# Patient Record
Sex: FEMALE | Race: WHITE | NOT HISPANIC OR LATINO | Employment: FULL TIME | URBAN - NONMETROPOLITAN AREA
[De-identification: names, ages, dates, MRNs, and addresses within clinical notes are randomized per-mention and may not be internally consistent; named-entity substitution may affect disease eponyms.]

---

## 2022-10-11 ENCOUNTER — OFFICE VISIT (OUTPATIENT)
Dept: ENDOCRINOLOGY | Facility: CLINIC | Age: 77
End: 2022-10-11

## 2022-10-11 VITALS
HEART RATE: 79 BPM | SYSTOLIC BLOOD PRESSURE: 132 MMHG | HEIGHT: 68 IN | BODY MASS INDEX: 35.31 KG/M2 | WEIGHT: 233 LBS | DIASTOLIC BLOOD PRESSURE: 60 MMHG | OXYGEN SATURATION: 99 %

## 2022-10-11 DIAGNOSIS — E06.3 HYPOTHYROIDISM DUE TO HASHIMOTO'S THYROIDITIS: ICD-10-CM

## 2022-10-11 DIAGNOSIS — E03.8 HYPOTHYROIDISM DUE TO HASHIMOTO'S THYROIDITIS: ICD-10-CM

## 2022-10-11 DIAGNOSIS — E10.21 DIABETIC NEPHROPATHY ASSOCIATED WITH TYPE 1 DIABETES MELLITUS: ICD-10-CM

## 2022-10-11 DIAGNOSIS — I10 PRIMARY HYPERTENSION: ICD-10-CM

## 2022-10-11 DIAGNOSIS — E10.649 TYPE 1 DIABETES MELLITUS WITH HYPOGLYCEMIA AND WITHOUT COMA: Primary | ICD-10-CM

## 2022-10-11 DIAGNOSIS — K90.41 GLUTEN INTOLERANCE: ICD-10-CM

## 2022-10-11 DIAGNOSIS — E78.2 MIXED HYPERLIPIDEMIA: ICD-10-CM

## 2022-10-11 PROBLEM — E78.5 HYPERLIPIDEMIA: Status: ACTIVE | Noted: 2022-10-11

## 2022-10-11 LAB — HBA1C MFR BLD: 6.7 %

## 2022-10-11 PROCEDURE — 99204 OFFICE O/P NEW MOD 45 MIN: CPT | Performed by: INTERNAL MEDICINE

## 2022-10-11 RX ORDER — ISOPROPYL ALCOHOL 0.7 ML/1
SWAB TOPICAL
Qty: 120 EACH | Refills: 11 | Status: SHIPPED | OUTPATIENT
Start: 2022-10-11

## 2022-10-11 RX ORDER — MULTIPLE VITAMINS W/ MINERALS TAB 9MG-400MCG
1 TAB ORAL 2 TIMES DAILY
COMMUNITY

## 2022-10-11 RX ORDER — GLUCAGON 3 MG/1
1 POWDER NASAL AS NEEDED
Qty: 2 EACH | Refills: 11 | Status: SHIPPED | OUTPATIENT
Start: 2022-10-11

## 2022-10-11 RX ORDER — GLUCOSAMINE HCL/CHONDROITIN SU 500-400 MG
CAPSULE ORAL
Qty: 150 EACH | Refills: 11 | Status: SHIPPED | OUTPATIENT
Start: 2022-10-11

## 2022-10-11 RX ORDER — INSULIN LISPRO 100 [IU]/ML
INJECTION, SOLUTION INTRAVENOUS; SUBCUTANEOUS
Qty: 30 ML | Refills: 11 | Status: SHIPPED | OUTPATIENT
Start: 2022-10-11

## 2022-10-11 RX ORDER — BLOOD-GLUCOSE METER
KIT MISCELLANEOUS
Qty: 120 EACH | Refills: 11 | Status: SHIPPED | OUTPATIENT
Start: 2022-10-11

## 2022-10-11 RX ORDER — AMLODIPINE BESYLATE 5 MG/1
5 TABLET ORAL DAILY
COMMUNITY
End: 2022-10-11

## 2022-10-11 RX ORDER — LANCING DEVICE
EACH MISCELLANEOUS
Qty: 1 EACH | Refills: 11 | Status: SHIPPED | OUTPATIENT
Start: 2022-10-11

## 2022-10-11 RX ORDER — FUROSEMIDE 40 MG/1
40 TABLET ORAL DAILY
COMMUNITY

## 2022-10-11 RX ORDER — INSULIN LISPRO 100 [IU]/ML
INJECTION, SOLUTION INTRAVENOUS; SUBCUTANEOUS DAILY
COMMUNITY
End: 2022-10-11

## 2022-10-11 RX ORDER — PROCHLORPERAZINE 25 MG/1
SUPPOSITORY RECTAL AS NEEDED
Qty: 9 EACH | Refills: 3 | Status: SHIPPED | OUTPATIENT
Start: 2022-10-11

## 2022-10-11 RX ORDER — LEVOTHYROXINE SODIUM 137 UG/1
137 TABLET ORAL DAILY
COMMUNITY

## 2022-10-11 RX ORDER — NEBIVOLOL 5 MG/1
5 TABLET ORAL DAILY
Qty: 30 TABLET | Refills: 11 | Status: SHIPPED | OUTPATIENT
Start: 2022-10-11

## 2022-10-11 RX ORDER — VALSARTAN 320 MG/1
320 TABLET ORAL DAILY
COMMUNITY

## 2022-10-11 RX ORDER — OMEPRAZOLE 20 MG/1
20 CAPSULE, DELAYED RELEASE ORAL DAILY
COMMUNITY

## 2022-10-11 RX ORDER — PROCHLORPERAZINE 25 MG/1
1 SUPPOSITORY RECTAL ONCE
Qty: 1 EACH | Refills: 3 | Status: SHIPPED | OUTPATIENT
Start: 2022-10-11 | End: 2022-10-11

## 2022-10-11 RX ORDER — GLUCOSAMINE HCL/CHONDROITIN SU 500-400 MG
CAPSULE ORAL
Qty: 120 EACH | Refills: 11 | Status: SHIPPED | OUTPATIENT
Start: 2022-10-11 | End: 2022-10-11 | Stop reason: SDUPTHER

## 2022-10-11 NOTE — PROGRESS NOTES
"Chief Complaint   Patient presents with   • Diabetes       History of Present Illness    77 y.o. female     Diabetes Type 1    Duration - since age 39     Complications -  None   Present Monitoring -      Fingersticks - 5 x daily      CGM -     Present Regimen -  670 g medtronics pump   Carb Intake -   Counts carbs  Exercise -   None   Symptoms -     None   ==========================================  Physical Exam  /60   Pulse 79   Ht 172.7 cm (68\")   Wt 106 kg (233 lb)   SpO2 99%   BMI 35.43 kg/m²   AOx3  No goiter, no carotid bruit  RRR  CTA  Bilateral edema  Right more than left       ==========================================    Laboratory Workup    No results found for: WBC, HGB, HCT, MCV, PLT    No results found for: GLUCOSE, BUN, CREATININE, EGFR, EGFRIFNONA, EGFRIFAFRI, BCR, NA, K, CL, CO2, CALCIUM, PHOS, ALBUMIN, GLOB, BILITOT, ALKPHOS, AST, ALT, AGRATIO    No results found for: EGFR      No results found for: MALBCRERATIO              ==========================================      ICD-10-CM ICD-9-CM   1. Type 1 diabetes mellitus with hypoglycemia and without coma ([facility])  E10.649 250.81       Diabetes Mellitus    --------------------------------------------------------------------------------------------------------------------------------------------    Glycemic Management   No results found for: HGBA1C    CGM 2 week review   Not using     670 G    MN 1.8  3 am 2  6:30 1.4  Noon 1.35  4 pm 1.45  8 pm 1.4    Carb ratio 4    Correction 20       Glucagon  baqsimi sent    Has back up lantus and syringes     Approve for Tandem and Dexcom to replace 670 g    Criteria for Approval of CGM and/or Insulin Pump     The patient has diabetes mellitus, insulin-dependent.    Our Diabetes Department has evaluated the patient in the last six months and will continue counseling on insulin adjustment.     The patient performs blood glucose testing at least four times daily with proven glucose variability from 50 " "to 300 mg per dl.    The patient is administering basal insulin and prandial insulin four times per day for more than six months.    The patient uses a home blood glucose monitor to assess blood glucose at least four times daily for more than six months.    The patient requires frequent adjustment of insulin treatment regimen based on blood glucose readings.    The patient has frequent variability in blood glucose readings due to activity and variability in meal content and time.     The patient has completed a diabetes education program with us.    The patient has demonstrated the ability to self-monitor her glucose.     The patient is motivated in improving diabetes control     ==========================================================================  Microvascular Complication Monitoring    Neuropathy                       None   Retinopathy   None   Renal     GFR   No results found for: EGFR  GFR 38 from June 2022 , no albumnuria    Agree w kerendia 10 mg daily started by Dr. Connelly   Albuminuria   No results found for: MALBCRERATIO         ==========================================================================    Lipids  No results found for: CHOL, CHLPL, TRIG, HDL, LDL, LDLDIRECT    Not on statin     ==========================================================================    HTN  /60   Pulse 79   Ht 172.7 cm (68\")   Wt 106 kg (233 lb)   SpO2 99%   BMI 35.43 kg/m²       On diovan     amlodipine 5 mg daily - hold due to edema    Start bystolic 5 mg daily     Lasix PRN, before on maxzide but hyponatremia     Has LE DVT , on xarelto     ==========================================================================     Thyroid Assessment  No results found for: TSH    On levothyroxine 137 daily     ==========================================================================    Vitamin B12  No results found for: [id number]      ==========================================================================   "           No orders of the defined types were placed in this encounter.               This document has been electronically signed by Cesar Mccabe MD on October 11, 2022 15:11 CDT

## 2022-10-12 ENCOUNTER — DOCUMENTATION (OUTPATIENT)
Dept: ENDOCRINOLOGY | Facility: CLINIC | Age: 77
End: 2022-10-12

## 2022-10-25 ENCOUNTER — DOCUMENTATION (OUTPATIENT)
Dept: ENDOCRINOLOGY | Facility: CLINIC | Age: 77
End: 2022-10-25

## 2022-10-25 NOTE — PROGRESS NOTES
DEXCOM SENSOR APPROVED 3 FOR 30 DAYS FROM 10/13/22 TO 10/13/23    SENT TO [facility] REC

## 2022-12-27 ENCOUNTER — TELEPHONE (OUTPATIENT)
Dept: ENDOCRINOLOGY | Facility: CLINIC | Age: 77
End: 2022-12-27

## 2022-12-27 NOTE — TELEPHONE ENCOUNTER
Pt reports having BG issues since starting new insulin pump. Pt reports BG from  (goes low then rebounds). Adjusted basal setting to MN 1.25 (from 1.35 to prevent low and subsequent high). No adjustment to bolus (Carb 4, correction 20) made. Pt currently using sleep schedule overnight. Pt will call back with further issues

## 2023-01-05 ENCOUNTER — TELEPHONE (OUTPATIENT)
Dept: ENDOCRINOLOGY | Facility: CLINIC | Age: 78
End: 2023-01-05
Payer: COMMERCIAL

## 2023-01-05 NOTE — TELEPHONE ENCOUNTER
Pt called, stating she is using a new pump (Tandem) and she's having problems with it - she states it's not reflecting a correct BS number: too low, too high, etc    She is requesting a phone call from Gabriel to discuss these issues, please.    Pt callback number [phone]

## 2023-01-05 NOTE — TELEPHONE ENCOUNTER
Pt reports low BG during the day (45-60s) but no nocturnal lows. Adjusted basal rate as follows: MN 1.25, 6A 1.00. Pt scheduled with educator

## 2023-01-24 ENCOUNTER — TELEPHONE (OUTPATIENT)
Dept: ENDOCRINOLOGY | Facility: CLINIC | Age: 78
End: 2023-01-24
Payer: COMMERCIAL

## 2023-01-24 RX ORDER — PROCHLORPERAZINE 25 MG/1
1 SUPPOSITORY RECTAL
Qty: 1 EACH | Refills: 3 | Status: SHIPPED | OUTPATIENT
Start: 2023-01-24

## 2023-01-24 NOTE — TELEPHONE ENCOUNTER
Pt reports -133; BG during day . Pt reports both overnight low BG and low BG after meals. Adjusted insulin pump settings as follows: MN 1.0, 6A 0.8; Carb ratio 6, Correction 20. Rx for Dexcom transmitter sent to preferred pharmacy as well.

## 2023-01-24 NOTE — TELEPHONE ENCOUNTER
Patient called and has asked for you to call her. She did not state what for.    Phone [phone]    Thank you

## 2023-02-03 ENCOUNTER — OFFICE VISIT (OUTPATIENT)
Dept: ENDOCRINOLOGY | Facility: CLINIC | Age: 78
End: 2023-02-03
Payer: COMMERCIAL

## 2023-02-03 VITALS
SYSTOLIC BLOOD PRESSURE: 150 MMHG | HEIGHT: 68 IN | HEART RATE: 71 BPM | WEIGHT: 233 LBS | DIASTOLIC BLOOD PRESSURE: 70 MMHG | BODY MASS INDEX: 35.31 KG/M2 | OXYGEN SATURATION: 100 %

## 2023-02-03 DIAGNOSIS — E06.3 HYPOTHYROIDISM DUE TO HASHIMOTO'S THYROIDITIS: ICD-10-CM

## 2023-02-03 DIAGNOSIS — E10.21 DIABETIC NEPHROPATHY ASSOCIATED WITH TYPE 1 DIABETES MELLITUS: ICD-10-CM

## 2023-02-03 DIAGNOSIS — E03.8 HYPOTHYROIDISM DUE TO HASHIMOTO'S THYROIDITIS: ICD-10-CM

## 2023-02-03 DIAGNOSIS — E10.649 TYPE 1 DIABETES MELLITUS WITH HYPOGLYCEMIA UNAWARENESS: ICD-10-CM

## 2023-02-03 DIAGNOSIS — I10 PRIMARY HYPERTENSION: ICD-10-CM

## 2023-02-03 DIAGNOSIS — E10.21 TYPE 1 DIABETES MELLITUS WITH NEPHROPATHY: Primary | ICD-10-CM

## 2023-02-03 DIAGNOSIS — E78.2 MIXED HYPERLIPIDEMIA: ICD-10-CM

## 2023-02-03 PROCEDURE — 95251 CONT GLUC MNTR ANALYSIS I&R: CPT | Performed by: INTERNAL MEDICINE

## 2023-02-03 PROCEDURE — 99214 OFFICE O/P EST MOD 30 MIN: CPT | Performed by: INTERNAL MEDICINE

## 2023-02-03 NOTE — PROGRESS NOTES
This will be a no charge.    Pt presents for insulin pump settings adjustment. Pt currently using Tandem TSlim x2 insulin pump. After multiple pump adjustments over the phone pt requested to use setting from previous pump (Medtronic 670G) in an attempt to control BG better. Pt current TIR 65%. Tandem report scanned into chart.  Entered the following into Tandem pump settings:     MN 1.8  3 am 2  6:30 1.4  Noon 1.35  4 pm 1.45  8 pm 1.4     Carb ratio 4     Correction 20     Discussed with pt the need to change her pump site for suspected occlusion/bad infusion site. Pt voiced understanding of this.

## 2023-02-03 NOTE — PROGRESS NOTES
CC  Type 1 DM     History of Present Illness    78 y.o. female     Diabetes Type 1    Duration - since age 39     Complications -  None   Present Monitoring -   Tandem , Dexcom      Carb Intake -   Counts carbs  Exercise -   None   Symptoms -     None   ==========================================  Physical Exam  There were no vitals taken for this visit.  AOx3  No goiter, no carotid bruit  RRR  CTA  Bilateral edema  Right more than left       ==========================================    Laboratory Workup    No results found for: WBC, HGB, HCT, MCV, PLT    No results found for: GLUCOSE, BUN, CREATININE, EGFR, EGFRIFNONA, EGFRIFAFRI, BCR, NA, K, CL, CO2, CALCIUM, PHOS, ALBUMIN, GLOB, BILITOT, ALKPHOS, AST, ALT, AGRATIO    No results found for: EGFR      No results found for: MALBCRERATIO              ==========================================      ICD-10-CM ICD-9-CM   1. Type 1 diabetes mellitus with nephropathy (HCC)  E10.21 250.41     583.81   2. Hypothyroidism due to Hashimoto's thyroiditis  E03.8 244.8    E06.3 245.2   3. Mixed hyperlipidemia  E78.2 272.2   4. Primary hypertension  I10 401.9   5. Diabetic nephropathy associated with type 1 diabetes mellitus (HCC)  E10.21 250.41     583.81       Diabetes Mellitus    --------------------------------------------------------------------------------------------------------------------------------------------    Glycemic Management   Lab Results   Component Value Date    HGBA1C 7.8 02/03/2023     Ambulatory Glucose Profile Report    Days Analyzed : 2 week period ending on 02/08/23      Interpretation : Diabetes Type 1 well controlled accounting age and comorbidities.     Just some frustration about alarms, we raised threshold to 200         GMI - 7.8%               ==========================================================================  Microvascular Complication Monitoring    Neuropathy                       None   Retinopathy   None   Renal     GFR   No results  found for: EGFR  GFR 38 from June 2022 , no albumnuria    Agree w kerendia 10 mg daily started by Dr. Connelly   Albuminuria   No results found for: MALBCRERATIO         ==========================================================================    Lipids  No results found for: CHOL, CHLPL, TRIG, HDL, LDL, LDLDIRECT    Not on statin     ==========================================================================    HTN  There were no vitals taken for this visit.      On diovan     amlodipine 5 mg daily - hold due to edema    Start bystolic 5 mg daily     Lasix PRN, before on maxzide but hyponatremia     Has LE DVT , on xarelto     ==========================================================================     Thyroid Assessment  No results found for: TSH    On levothyroxine 137 daily     ==========================================================================    Vitamin B12  No results found for: [id number]      ==========================================================================             Orders Placed This Encounter   Procedures   • Hemoglobin A1c     This order was created through External Result Entry     Order Specific Question:   Release to patient     Answer:   Routine Release                This document has been electronically signed by Cesar Mccabe MD on February 8, 2023 13:32 CST

## 2023-02-08 LAB — HBA1C MFR BLD: 7.8 %

## 2023-09-11 RX ORDER — NEBIVOLOL 5 MG/1
TABLET ORAL
Qty: 90 TABLET | Refills: 3 | Status: SHIPPED | OUTPATIENT
Start: 2023-09-11